# Patient Record
Sex: MALE | Race: WHITE
[De-identification: names, ages, dates, MRNs, and addresses within clinical notes are randomized per-mention and may not be internally consistent; named-entity substitution may affect disease eponyms.]

---

## 2017-08-23 ENCOUNTER — HOSPITAL ENCOUNTER (EMERGENCY)
Dept: HOSPITAL 74 - FER | Age: 27
Discharge: HOME | End: 2017-08-23
Payer: COMMERCIAL

## 2017-08-23 VITALS — DIASTOLIC BLOOD PRESSURE: 79 MMHG | SYSTOLIC BLOOD PRESSURE: 124 MMHG | HEART RATE: 55 BPM | TEMPERATURE: 97.5 F

## 2017-08-23 VITALS — BODY MASS INDEX: 27.7 KG/M2
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
## 2017-08-23 DIAGNOSIS — S93.402A: Primary | ICD-10-CM
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
## 2017-08-23 NOTE — PDOC
History of Present Illness





- History of Present Illness


Initial Comments: 


08/23/17 18:17


The patient is a 26 year old male, with no significant past medical history, 

who presents to the emergency department with, left ankle pain and soft tissue 

swelling s/p fall from climbing approx. one week ago. The patient reports that 

he fell while climbing and rolled his left ankle out. The patient states the 

left ankle swelling has improved throughout the past week but is still painful 

and swollen. 





He denies any recent fevers, chills, headache or dizziness. 


He denies any recent nausea, vomit, diarrhea or constipation. 


He denies any recent chest pain or shortness of breath. 


He denies any recent dysuria, frequency, urgency or hematuria.








08/23/17 18:43








<Nigel Constantino - Last Filed: 08/23/17 18:43>





<Joon Allen - Last Filed: 08/23/17 19:08>





- General


Chief Complaint: Pain, Acute


Stated Complaint: LEFT ANKLE PAIN


Time Seen by Provider: 08/23/17 18:01





Past History





<Nigel Constantino - Last Filed: 08/23/17 18:43>





- Past Medical History


Other medical history: DENIES





- Psycho/Social/Smoking Cessation Hx


Anxiety: No


Suicidal Ideation: No


Smoking History: Never smoked


Hx Alcohol Use: Yes


Drug/Substance Use Hx: No


Substance Use Type: Alcohol





<Joon Allen - Last Filed: 08/23/17 19:08>





- Past Medical History


Allergies/Adverse Reactions: 


 Allergies











Allergy/AdvReac Type Severity Reaction Status Date / Time


 


amoxicillin Allergy   Verified 08/23/17 17:56











Home Medications: 


Ambulatory Orders





NK [No Known Home Medication]  08/23/17 











**Review of Systems





- Review of Systems


Comments:: 





08/23/17 18:18


GENERAL/CONSTITUTIONAL: No fever or chills. No weakness.


HEAD, EYES, EARS, NOSE AND THROAT: No change in vision. No ear pain or 

discharge. No sore throat.


CARDIOVASCULAR: No chest pain or shortness of breath.


RESPIRATORY: No cough, wheezing, or hemoptysis.


GASTROINTESTINAL: No nausea, vomiting, diarrhea or constipation.


GENITOURINARY: No dysuria, frequency, or change in urination.


MUSCULOSKELETAL: +Left ankle pain. +Left ankle swelling. No neck or back pain.


SKIN: No rash


NEUROLOGIC: No headache, vertigo, loss of consciousness, or change in strength/

sensation.


ENDOCRINE: No increased thirst. No abnormal weight change.


HEMATOLOGIC/LYMPHATIC: No anemia, easy bleeding, or history of blood clots.


ALLERGIC/IMMUNOLOGIC: No hives or skin allergy.








<Nigel Constantino - Last Filed: 08/23/17 18:43>





*Physical Exam





- Vital Signs


 Last Vital Signs











Temp Pulse Resp BP Pulse Ox


 


 97.5 F L  55 L  18   124/79   100 


 


 08/23/17 17:56  08/23/17 17:56  08/23/17 17:56  08/23/17 17:56  08/23/17 17:56














- Physical Exam


Comments: 





08/23/17 18:23


GENERAL: Awake, alert, and fully oriented, in no acute distress


HEAD: No signs of trauma


EYES: PERRLA, EOMI, sclera anicteric, conjunctiva clear


ENT: Auricles normal inspection, hearing grossly normal, nares patent, 

oropharynx clear without exudates. Moist mucosa


NECK: Normal ROM, supple, no lymphadenopathy, JVD, or masses


LUNGS: Breath sounds equal, clear to auscultation bilaterally.  No wheezes, and 

no crackles


HEART: Regular rate and rhythm, normal S1 and S2, no murmurs, rubs or gallops


ABDOMEN: Soft, nontender, normoactive bowel sounds.  No guarding, no rebound.  

No masses


EXTREMITIES: +Soft tissue swelling lateral malleolus and foot on the left lower 

extremity, most tender to the distal fibula. No ligament laxity, foot grossly 

intact. Normal range of motion. No clubbing or cyanosis. No cords, erythema.


NEUROLOGICAL: Cranial nerves II through XII grossly intact.  Normal speech, 

normal gait


SKIN: Warm, Dry, normal turgor, no rashes or lesions noted.








<Nigel Constantino - Last Filed: 08/23/17 18:43>





- Vital Signs


 Last Vital Signs











Temp Pulse Resp BP Pulse Ox


 


 97.5 F L  55 L  18   124/79   100 


 


 08/23/17 17:56  08/23/17 17:56  08/23/17 17:56  08/23/17 17:56  08/23/17 17:56














<Joon Allen - Last Filed: 08/23/17 19:08>





Medical Decision Making





- Medical Decision Making





08/23/17 19:05


No Fx Seen on X-Ray


Radiologists Reading pending





<Joon Allen - Last Filed: 08/23/17 19:08>





*DC/Admit/Observation/Transfer





- Attestations


Scribe Attestion: 





08/23/17 18:25





Documentation prepared by Nigel Constantino, acting as medical scribe for Joon Allen DO.





<Nigel Constantino - Last Filed: 08/23/17 18:43>





- Attestations


Physician Attestion: 





08/23/17 18:01








I, Dr. Joon Allen, attest that this document has been prepared under my 

direction and personally reviewed by me in its entirety.   I further attest, 

that it accurately reflects all work, treatment, procedures and medical decision

-making performed by me.  





<Joon Allen - Last Filed: 08/23/17 19:08>


Diagnosis at time of Disposition: 


Severe sprain of left ankle


Qualifiers:


 Encounter type: initial encounter Qualified Code(s): S93.402A - Sprain of 

unspecified ligament of left ankle, initial encounter





- Discharge Dispostion


Disposition: HOME


Condition at time of disposition: Good





- Patient Instructions


Printed Discharge Instructions:  DI for Ankle Sprain


Additional Instructions: 


Kole Valverde this happened but I do not see a fracture.  If the radiologist does we 

will give you a call.  Otherwise baby it for another week and keep it elevated 

as much  as possible








Best-


Dr. Joon Allen

## 2023-02-15 ENCOUNTER — HOSPITAL ENCOUNTER (EMERGENCY)
Dept: HOSPITAL 74 - FER | Age: 33
Discharge: HOME | End: 2023-02-15
Payer: COMMERCIAL

## 2023-02-15 VITALS
RESPIRATION RATE: 18 BRPM | DIASTOLIC BLOOD PRESSURE: 81 MMHG | HEART RATE: 78 BPM | TEMPERATURE: 97.8 F | SYSTOLIC BLOOD PRESSURE: 137 MMHG

## 2023-02-15 VITALS — BODY MASS INDEX: 29 KG/M2
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
## 2023-02-15 DIAGNOSIS — S61.212A: Primary | ICD-10-CM

## 2023-02-15 PROCEDURE — 0HQFXZZ REPAIR RIGHT HAND SKIN, EXTERNAL APPROACH: ICD-10-PCS
